# Patient Record
Sex: MALE | Race: OTHER
[De-identification: names, ages, dates, MRNs, and addresses within clinical notes are randomized per-mention and may not be internally consistent; named-entity substitution may affect disease eponyms.]

---
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
## 2021-02-15 ENCOUNTER — TRANSCRIPTION ENCOUNTER (OUTPATIENT)
Age: 21
End: 2021-02-15

## 2021-06-01 NOTE — ED ADULT NURSE NOTE - PAIN RATING/NUMBER SCALE (0-10): ACTIVITY
-Continue home gabapentin  
-continue levothyroxine 125 mcg qd  
Culture negative, no symptoms  
Patient presented with altered mental status with psychosis and was incidentally found to have 2/2 blood cultures positive for pansensitive Staphylococcus capitis, a coagulase negative staphylococcus. Repeat cultures are pending and negative at this time. She does not appear to have any orthopedic hardware, although she did have a rotator cuff repair 7 months ago no hardware is visible on imaging. Her altered mental status is likely medication-associated by history. Her CoNS bacteremia may represent contamination as there is no clear source and this type of infection is almost always associated with prosthetic or venous catheter infection. There is no elevated sedimentation rate, fever, or leukocytosis. A transthoracic echocardiogram was negative.  -Hold antibiotics  -Follow repeat cultures  -MRI given back pain  -Infectious disease consultation  
Resolved  Patient reports subjective feeling of altered state in the last 1.5 months? Patient's son has noted behavioral changes over the last few days. 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  -A&Ox3, improving mental status, feels relatively normal and back to baseline  -Likely related to medication changes and possible systemic corticosteroid absorption given psychotic symptoms  -Decrease gabapentin go 300mg QHS  
3

## 2021-07-28 ENCOUNTER — EMERGENCY (EMERGENCY)
Facility: HOSPITAL | Age: 21
LOS: 0 days | Discharge: LEFT BEFORE DISCHARGE | End: 2021-07-28
Attending: EMERGENCY MEDICINE
Payer: COMMERCIAL

## 2021-07-28 VITALS
WEIGHT: 165.35 LBS | HEART RATE: 69 BPM | TEMPERATURE: 99 F | RESPIRATION RATE: 20 BRPM | SYSTOLIC BLOOD PRESSURE: 127 MMHG | DIASTOLIC BLOOD PRESSURE: 62 MMHG | OXYGEN SATURATION: 100 %

## 2021-07-28 DIAGNOSIS — Z53.21 PROCEDURE AND TREATMENT NOT CARRIED OUT DUE TO PATIENT LEAVING PRIOR TO BEING SEEN BY HEALTH CARE PROVIDER: ICD-10-CM

## 2021-07-28 DIAGNOSIS — J34.89 OTHER SPECIFIED DISORDERS OF NOSE AND NASAL SINUSES: ICD-10-CM

## 2021-07-28 PROCEDURE — L9991: CPT

## 2021-07-28 NOTE — MEDICAL STUDENT PEDIATRIC H&P (EDUCATION) - NS MD HP STUD HX OF PRESENT ILLNESS FT
Pt is a 21 year old male with no significant Pmhx presenting to the ED following an elbow to nose injury during a basketball game last night around 10 PM. The impact was on the left side of the nose below the occipital area resulting in bleeding which resolved in 5 minutes. Patient complains of a stuffy nose and slight difficulty breathing as a result, but no shortness of breath. Patient denies LOS or any changes in vision and reports no head trauma. He has not in pain on presentation, and did not take any medication to reduce pain. Patient was wearing glasses at the time of the impact but the glasses did not break. There is no history of bleeding disorders.     Patient is taking multivitamins and no other medications.   No known allergies x3  No significant family history-   surgical history-   PMD -
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
## 2023-04-10 NOTE — ED PROVIDER NOTE - CARDIAC, MLM
Normal rate, regular rhythm.  Heart sounds S1, S2.  No murmurs, rubs or gallops. Closure 2 Information: This tab is for additional flaps and grafts, including complex repair and grafts and complex repair and flaps. You can also specify a different location for the additional defect, if the location is the same you do not need to select a new one. We will insert the automated text for the repair you select below just as we do for solitary flaps and grafts. Please note that at this time if you select a location with a different insurance zone you will need to override the ICD10 and CPT if appropriate.

## 2024-04-27 ENCOUNTER — NON-APPOINTMENT (OUTPATIENT)
Age: 24
End: 2024-04-27

## 2024-04-28 ENCOUNTER — APPOINTMENT (OUTPATIENT)
Dept: ORTHOPEDIC SURGERY | Facility: CLINIC | Age: 24
End: 2024-04-28
Payer: COMMERCIAL

## 2024-04-28 DIAGNOSIS — S69.91XA UNSPECIFIED INJURY OF RIGHT WRIST, HAND AND FINGER(S), INITIAL ENCOUNTER: ICD-10-CM

## 2024-04-28 PROBLEM — Z00.00 ENCOUNTER FOR PREVENTIVE HEALTH EXAMINATION: Status: ACTIVE | Noted: 2024-04-28

## 2024-04-28 PROCEDURE — 73130 X-RAY EXAM OF HAND: CPT | Mod: RT

## 2024-04-28 PROCEDURE — 99203 OFFICE O/P NEW LOW 30 MIN: CPT

## 2024-04-28 NOTE — DISCUSSION/SUMMARY
[de-identified] : I reviewed the x-ray findings with the patient.  He will remain in the cock-up wrist brace.  He may come out of the brace at his leisure.  The right index and middle fingers were jyotsna taped.  I recommend ibuprofen for pain and icing the hand.  He will follow-up in 2 weeks for further evaluation with ARIADNE Lino.
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
## 2024-04-28 NOTE — DATA REVIEWED
[Right] : of the right [Hand] : hand [FreeTextEntry1] : 3 views of the right hand were obtained here in the office today and show: No fracture or dislocation.

## 2024-04-28 NOTE — IMAGING
[de-identified] : Physical exam of the right hand: Mild edema noted over the dorsal surface of the right hand.  He has small superficial abrasions of the dorsal surface of the proximal phalanx of the right index finger as well as the webspace between the right index finger right middle finger.  There is a small area of ecchymosis over the proximal phalanx of the right middle finger.  Full range of motion with flexion and extension.  No rotational deformities with range of motion.  Tenderness to palpation over the right third metacarpal neck.  No tenderness to palpation over the proximal phalanx or the PIP joint of the right middle finger

## 2024-05-16 ENCOUNTER — APPOINTMENT (OUTPATIENT)
Dept: ORTHOPEDIC SURGERY | Facility: CLINIC | Age: 24
End: 2024-05-16

## 2024-06-05 ENCOUNTER — EMERGENCY (EMERGENCY)
Facility: HOSPITAL | Age: 24
LOS: 0 days | Discharge: HOME | End: 2024-06-05
Attending: EMERGENCY MEDICINE
Payer: COMMERCIAL

## 2024-06-05 VITALS
DIASTOLIC BLOOD PRESSURE: 65 MMHG | OXYGEN SATURATION: 100 % | WEIGHT: 169.98 LBS | HEART RATE: 68 BPM | SYSTOLIC BLOOD PRESSURE: 107 MMHG | TEMPERATURE: 98 F | RESPIRATION RATE: 16 BRPM | HEIGHT: 69 IN
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
## 2024-06-05 DIAGNOSIS — F17.200 NICOTINE DEPENDENCE, UNSPECIFIED, UNCOMPLICATED: ICD-10-CM
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
## 2024-06-05 DIAGNOSIS — M54.2 CERVICALGIA: ICD-10-CM

## 2024-06-05 DIAGNOSIS — F90.9 ATTENTION-DEFICIT HYPERACTIVITY DISORDER, UNSPECIFIED TYPE: ICD-10-CM

## 2024-06-05 PROCEDURE — 99284 EMERGENCY DEPT VISIT MOD MDM: CPT | Mod: 25

## 2024-06-05 PROCEDURE — 72125 CT NECK SPINE W/O DYE: CPT | Mod: 26,MC

## 2024-06-05 PROCEDURE — 96372 THER/PROPH/DIAG INJ SC/IM: CPT

## 2024-06-05 PROCEDURE — 99284 EMERGENCY DEPT VISIT MOD MDM: CPT

## 2024-06-05 PROCEDURE — 72125 CT NECK SPINE W/O DYE: CPT | Mod: MC

## 2024-06-05 RX ORDER — METHOCARBAMOL 500 MG/1
1000 TABLET, FILM COATED ORAL ONCE
Refills: 0 | Status: COMPLETED | OUTPATIENT
Start: 2024-06-05 | End: 2024-06-05

## 2024-06-05 RX ORDER — IBUPROFEN 200 MG
1 TABLET ORAL
Qty: 21 | Refills: 0
Start: 2024-06-05 | End: 2024-06-11

## 2024-06-05 RX ORDER — KETOROLAC TROMETHAMINE 30 MG/ML
60 SYRINGE (ML) INJECTION ONCE
Refills: 0 | Status: DISCONTINUED | OUTPATIENT
Start: 2024-06-05 | End: 2024-06-05

## 2024-06-05 RX ADMIN — Medication 60 MILLIGRAM(S): at 09:43

## 2024-06-05 RX ADMIN — METHOCARBAMOL 1000 MILLIGRAM(S): 500 TABLET, FILM COATED ORAL at 09:43

## 2024-06-05 NOTE — ED PROVIDER NOTE - PATIENT PORTAL LINK FT
You can access the FollowMyHealth Patient Portal offered by [facility] by registering at the following website: [email or web address]. By joining [employer]’s FollowMyHealth portal, you will also be able to view your health information using other applications (apps) compatible with our system.

## 2024-06-05 NOTE — ED PROVIDER NOTE - ATTENDING APP SHARED VISIT CONTRIBUTION OF CARE
24-year-old male who with neck and upper back pain while bench pressing this morning.  No focal weakness or paresthesias, no headache no chest pain, no difficulties ambulating or any other acute complaints.  There is left-sided paraspinal cervical muscle tenderness to palpation, mild midline spine TTP, full range of motion at all joints, normal neurovascular exam.  Plan: Analgesia, ice, imaging, reassess.
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
## 2024-06-05 NOTE — ED ADULT TRIAGE NOTE - CHIEF COMPLAINT QUOTE
"Hurt upper part of my back lower part of my neck" Pt reports he was benching at the gym and didn't get support.

## 2024-06-05 NOTE — ED PROVIDER NOTE - CLINICAL SUMMARY MEDICAL DECISION MAKING FREE TEXT BOX
24-year-old male with cervical muscle spasm status post bench pressing today.  Neurologically intact.  Reported feeling much better with treatment in ED.  Cervical spine CT is negative.  Anticipatory guidance provided, collateral information obtained for the patient's father, advised to follow-up outpatient, return to emergency room immediately if any worsening/concerning symptoms.  Both verbalized understanding and are amenable to DC plan.

## 2024-06-26 ENCOUNTER — EMERGENCY (EMERGENCY)
Facility: HOSPITAL | Age: 24
LOS: 0 days | Discharge: HOME | End: 2024-06-26
Attending: EMERGENCY MEDICINE
Payer: COMMERCIAL

## 2024-06-26 VITALS
DIASTOLIC BLOOD PRESSURE: 62 MMHG | WEIGHT: 171.96 LBS | HEIGHT: 69 IN | RESPIRATION RATE: 18 BRPM | SYSTOLIC BLOOD PRESSURE: 158 MMHG | HEART RATE: 69 BPM | OXYGEN SATURATION: 98 % | TEMPERATURE: 98 F

## 2024-06-26 VITALS
RESPIRATION RATE: 18 BRPM | OXYGEN SATURATION: 99 % | DIASTOLIC BLOOD PRESSURE: 65 MMHG | SYSTOLIC BLOOD PRESSURE: 140 MMHG | HEART RATE: 65 BPM | TEMPERATURE: 97 F

## 2024-06-26 PROCEDURE — 99284 EMERGENCY DEPT VISIT MOD MDM: CPT | Mod: 25

## 2024-06-26 PROCEDURE — 70450 CT HEAD/BRAIN W/O DYE: CPT | Mod: MC

## 2024-06-26 PROCEDURE — 70450 CT HEAD/BRAIN W/O DYE: CPT | Mod: 26,MC

## 2024-06-26 PROCEDURE — 72125 CT NECK SPINE W/O DYE: CPT | Mod: 26,MC

## 2024-06-26 PROCEDURE — 99284 EMERGENCY DEPT VISIT MOD MDM: CPT

## 2024-06-26 PROCEDURE — 72125 CT NECK SPINE W/O DYE: CPT | Mod: MC

## 2024-06-26 NOTE — ED ADULT NURSE NOTE - NSFALLUNIVINTERV_ED_ALL_ED
Bed/Stretcher in lowest position, wheels locked, appropriate side rails in place/Call bell, personal items and telephone in reach/Instruct patient to call for assistance before getting out of bed/chair/stretcher/Non-slip footwear applied when patient is off stretcher/[place] to call system/Physically safe environment - no spills, clutter or unnecessary equipment/Purposeful proactive rounding/Room/bathroom lighting operational, light cord in reach

## 2024-06-26 NOTE — ED PROVIDER NOTE - PATIENT PORTAL LINK FT
You can access the FollowMyHealth Patient Portal offered by [facility] by registering at the following website: [email or web address]. By joining [employer]’s FollowMyHealth portal, you will also be able to view your health information using other applications (apps) compatible with our system.

## 2024-06-26 NOTE — ED ADULT TRIAGE NOTE - CHIEF COMPLAINT QUOTE
Patient states he was working out two days ago when he was lifting he felt a "pop" in his head, ever since has been experience headaches

## 2024-06-26 NOTE — ED PROVIDER NOTE - NSFOLLOWUPCLINICS_GEN_ALL_ED_FT
[facility]  Neurology  [address] [place] [address], [address]  [place] [address]  Phone: [phone]  Fax:

## 2024-06-26 NOTE — ED PROVIDER NOTE - OBJECTIVE STATEMENT
24 year old male with no relevant past medical history presents to the ED with headache. Patient states that he was Benching on Friday when he felt a sudden "pop" in his head. Since the event he has had intermittent mild headaches. Denies associated vision changes, dizziness, neck pain, paresthesias, weakness, chest pain, shortness of breath, abdominal pain, nausea, vomiting.

## 2024-08-17 NOTE — ED PROVIDER NOTE - CARE PLAN
FAMILY HISTORY:  Mother  Still living? Unknown  Family history of heart attack, Age at diagnosis: Age Unknown  FH: hypertension, Age at diagnosis: Age Unknown    
Assessment and plan of treatment:	Left without being seen

## 2025-09-10 ENCOUNTER — NON-APPOINTMENT (OUTPATIENT)
Age: 25
End: 2025-09-10

## 2025-09-19 ENCOUNTER — APPOINTMENT (OUTPATIENT)
Dept: ORTHOPEDIC SURGERY | Facility: CLINIC | Age: 25
End: 2025-09-19
Payer: COMMERCIAL

## 2025-09-19 VITALS — HEIGHT: 71 IN

## 2025-09-19 DIAGNOSIS — S93.491A SPRAIN OF OTHER LIGAMENT OF RIGHT ANKLE, INITIAL ENCOUNTER: ICD-10-CM

## 2025-09-19 PROCEDURE — 99213 OFFICE O/P EST LOW 20 MIN: CPT | Mod: 25
